# Patient Record
Sex: FEMALE | Race: WHITE | NOT HISPANIC OR LATINO | URBAN - METROPOLITAN AREA
[De-identification: names, ages, dates, MRNs, and addresses within clinical notes are randomized per-mention and may not be internally consistent; named-entity substitution may affect disease eponyms.]

---

## 2017-03-08 ENCOUNTER — IMPORTED ENCOUNTER (OUTPATIENT)
Dept: URBAN - METROPOLITAN AREA CLINIC 31 | Facility: CLINIC | Age: 72
End: 2017-03-08

## 2017-03-08 PROBLEM — H25.13: Noted: 2017-03-08

## 2017-03-08 PROBLEM — H40.1131: Noted: 2017-03-08

## 2017-03-08 PROCEDURE — 99214 OFFICE O/P EST MOD 30 MIN: CPT

## 2017-03-08 NOTE — PATIENT DISCUSSION
1.  Nuclear Sclerotic Cataract OU: Explained how cataracts can effect vision. Recommend clinical observation. The patient was advised to contact us if any change or worsening of vision. 2. Primary open angle glaucoma OU - Continue with current treatment plan. Discussed importance of compliance. Will continue to monitor. 3.  Return for an appointment in 6 months for comprehensive exam. VF 30-2. Fundus Photo Disc. with Dr. Lily Bravo.

## 2017-09-15 ENCOUNTER — IMPORTED ENCOUNTER (OUTPATIENT)
Dept: URBAN - METROPOLITAN AREA CLINIC 31 | Facility: CLINIC | Age: 72
End: 2017-09-15

## 2017-09-15 PROBLEM — H25.13: Noted: 2017-09-15

## 2017-09-15 PROBLEM — H40.1131: Noted: 2017-09-15

## 2017-09-15 PROCEDURE — 92083 EXTENDED VISUAL FIELD XM: CPT

## 2017-09-15 PROCEDURE — 92250 FUNDUS PHOTOGRAPHY W/I&R: CPT

## 2017-09-15 PROCEDURE — 92014 COMPRE OPH EXAM EST PT 1/>: CPT

## 2017-09-15 NOTE — PATIENT DISCUSSION
Primary open angle glaucoma OU - Continue with current treatment plan. Discussed importance of compliance. Will continue to monitor.

## 2018-03-15 ENCOUNTER — IMPORTED ENCOUNTER (OUTPATIENT)
Dept: URBAN - METROPOLITAN AREA CLINIC 31 | Facility: CLINIC | Age: 73
End: 2018-03-15

## 2018-03-15 PROBLEM — H25.13: Noted: 2018-03-15

## 2018-03-15 PROBLEM — H40.1130: Noted: 2018-03-15

## 2018-03-15 PROCEDURE — 92250 FUNDUS PHOTOGRAPHY W/I&R: CPT

## 2018-03-15 PROCEDURE — 99213 OFFICE O/P EST LOW 20 MIN: CPT

## 2018-03-15 NOTE — PATIENT DISCUSSION
1.  Nuclear Sclerotic Cataract OU: Explained how cataracts can effect vision. Recommend clinical observation. The patient was advised to contact us if any change or worsening of vision. 2. Primary open angle glaucoma OU:  Continue with current treatment plan. Discussed importance of compliance. Will continue to monitor.

## 2018-05-29 ENCOUNTER — APPOINTMENT (RX ONLY)
Dept: URBAN - METROPOLITAN AREA CLINIC 122 | Facility: CLINIC | Age: 73
Setting detail: DERMATOLOGY
End: 2018-05-29

## 2018-05-29 DIAGNOSIS — L85.3 XEROSIS CUTIS: ICD-10-CM

## 2018-05-29 DIAGNOSIS — L82.1 OTHER SEBORRHEIC KERATOSIS: ICD-10-CM

## 2018-05-29 DIAGNOSIS — L21.8 OTHER SEBORRHEIC DERMATITIS: ICD-10-CM

## 2018-05-29 DIAGNOSIS — D18.0 HEMANGIOMA: ICD-10-CM

## 2018-05-29 PROBLEM — L29.8 OTHER PRURITUS: Status: ACTIVE | Noted: 2018-05-29

## 2018-05-29 PROBLEM — D18.01 HEMANGIOMA OF SKIN AND SUBCUTANEOUS TISSUE: Status: ACTIVE | Noted: 2018-05-29

## 2018-05-29 PROCEDURE — 99203 OFFICE O/P NEW LOW 30 MIN: CPT

## 2018-05-29 PROCEDURE — ? COUNSELING

## 2018-05-29 PROCEDURE — ? TREATMENT REGIMEN

## 2018-05-29 PROCEDURE — ? PRESCRIPTION

## 2018-05-29 RX ORDER — CLOBETASOL PROPIONATE 0.46 MG/ML
SOLUTION TOPICAL
Qty: 1 | Refills: 1 | Status: ERX | COMMUNITY
Start: 2018-05-29

## 2018-05-29 RX ADMIN — CLOBETASOL PROPIONATE: 0.46 SOLUTION TOPICAL at 14:22

## 2018-05-29 ASSESSMENT — LOCATION ZONE DERM
LOCATION ZONE: FACE
LOCATION ZONE: TRUNK
LOCATION ZONE: LEG
LOCATION ZONE: ARM
LOCATION ZONE: SCALP

## 2018-05-29 ASSESSMENT — LOCATION SIMPLE DESCRIPTION DERM
LOCATION SIMPLE: LEFT CHEEK
LOCATION SIMPLE: CHEST
LOCATION SIMPLE: RIGHT FOREARM
LOCATION SIMPLE: SCALP
LOCATION SIMPLE: LEFT PRETIBIAL REGION

## 2018-05-29 ASSESSMENT — LOCATION DETAILED DESCRIPTION DERM
LOCATION DETAILED: LEFT MEDIAL SUPERIOR CHEST
LOCATION DETAILED: RIGHT SUPERIOR PARIETAL SCALP
LOCATION DETAILED: LEFT INFERIOR CENTRAL MALAR CHEEK
LOCATION DETAILED: RIGHT PROXIMAL DORSAL FOREARM
LOCATION DETAILED: LEFT DISTAL PRETIBIAL REGION

## 2018-05-29 NOTE — PROCEDURE: TREATMENT REGIMEN
Detail Level: Zone
Otc Regimen: Apply Mineral oil to the scalp at night and put a shower cap then wash off in the morning with T-Sal shampoo

## 2018-06-26 ENCOUNTER — APPOINTMENT (RX ONLY)
Dept: URBAN - METROPOLITAN AREA CLINIC 122 | Facility: CLINIC | Age: 73
Setting detail: DERMATOLOGY
End: 2018-06-26

## 2018-06-26 DIAGNOSIS — L21.8 OTHER SEBORRHEIC DERMATITIS: ICD-10-CM | Status: STABLE

## 2018-06-26 PROCEDURE — 99213 OFFICE O/P EST LOW 20 MIN: CPT

## 2018-06-26 PROCEDURE — ? TREATMENT REGIMEN

## 2018-06-26 PROCEDURE — ? COUNSELING

## 2018-06-26 ASSESSMENT — LOCATION DETAILED DESCRIPTION DERM: LOCATION DETAILED: RIGHT SUPERIOR PARIETAL SCALP

## 2018-06-26 ASSESSMENT — LOCATION ZONE DERM: LOCATION ZONE: SCALP

## 2018-06-26 ASSESSMENT — LOCATION SIMPLE DESCRIPTION DERM: LOCATION SIMPLE: SCALP

## 2018-06-26 NOTE — PROCEDURE: TREATMENT REGIMEN
Otc Regimen: Apply Mineral oil to the scalp at night and put a shower cap then wash off in the morning with T-Sal shampoo
Detail Level: Zone
Plan: Continue Clobetasol solution 0.05% BID x 2 weeks PRN flare ups

## 2018-09-24 ENCOUNTER — IMPORTED ENCOUNTER (OUTPATIENT)
Dept: URBAN - METROPOLITAN AREA CLINIC 31 | Facility: CLINIC | Age: 73
End: 2018-09-24

## 2018-09-24 PROBLEM — H25.813: Noted: 2018-09-24

## 2018-09-24 PROBLEM — H40.1130: Noted: 2018-09-24

## 2018-09-24 PROCEDURE — 92250 FUNDUS PHOTOGRAPHY W/I&R: CPT

## 2018-09-24 PROCEDURE — 92014 COMPRE OPH EXAM EST PT 1/>: CPT

## 2018-09-24 NOTE — PATIENT DISCUSSION
1.  Primary open angle glaucoma OU:  Continue with current treatment plan. Discussed importance of compliance. Will continue to monitor. 6 mos IOP. No VF this year. 2.  Combined Types of Cataract OU: Explained how cataracts can effect vision. Recommend clinical observation. The patient was advised to contact us if any change or worsening of vision.

## 2019-03-25 ENCOUNTER — IMPORTED ENCOUNTER (OUTPATIENT)
Dept: URBAN - METROPOLITAN AREA CLINIC 31 | Facility: CLINIC | Age: 74
End: 2019-03-25

## 2019-03-25 PROBLEM — H40.1131: Noted: 2019-03-25

## 2019-03-25 PROCEDURE — 92012 INTRM OPH EXAM EST PATIENT: CPT

## 2019-03-25 NOTE — PATIENT DISCUSSION
Primary open angle glaucoma OU mild - Continue with current treatment plan. Discussed importance of compliance. Will continue to monitor.    6 mos CE/VF

## 2019-06-19 ENCOUNTER — IMPORTED ENCOUNTER (OUTPATIENT)
Dept: URBAN - METROPOLITAN AREA CLINIC 31 | Facility: CLINIC | Age: 74
End: 2019-06-19

## 2019-06-19 PROBLEM — H40.1131: Noted: 2019-06-19

## 2019-06-19 PROBLEM — H35.3131: Noted: 2019-06-19

## 2019-06-19 PROCEDURE — 99213 OFFICE O/P EST LOW 20 MIN: CPT

## 2019-06-19 PROCEDURE — 92083 EXTENDED VISUAL FIELD XM: CPT

## 2019-06-19 PROCEDURE — 92250 FUNDUS PHOTOGRAPHY W/I&R: CPT

## 2019-06-19 NOTE — PATIENT DISCUSSION
1.  Primary open angle glaucoma OU mild - Continue with current treatment plan. Discussed importance of compliance. Will continue to monitor. 6 mos CE. 2. ARMD OU dry - Importance of smoking cessation blood pressure control and healthy diet were emphasized. In accordance with the AREDS study a good multivitamin containing EC and Zinc were recommened to be taken daily. Patient was instructed to self monitor their monocular vision (reading/[employer] Grid) at least weekly. Patient should immediately report any new onset of decreased vision or metamorphopsia.

## 2019-12-19 ENCOUNTER — IMPORTED ENCOUNTER (OUTPATIENT)
Dept: URBAN - METROPOLITAN AREA CLINIC 31 | Facility: CLINIC | Age: 74
End: 2019-12-19

## 2019-12-19 PROBLEM — H25.813: Noted: 2019-12-19

## 2019-12-19 PROBLEM — H40.1131: Noted: 2019-12-19

## 2019-12-19 PROBLEM — H35.3131: Noted: 2019-12-19

## 2019-12-19 PROCEDURE — 92250 FUNDUS PHOTOGRAPHY W/I&R: CPT

## 2019-12-19 PROCEDURE — 92014 COMPRE OPH EXAM EST PT 1/>: CPT

## 2020-06-22 ENCOUNTER — IMPORTED ENCOUNTER (OUTPATIENT)
Dept: URBAN - METROPOLITAN AREA CLINIC 31 | Facility: CLINIC | Age: 75
End: 2020-06-22

## 2020-06-22 PROBLEM — H40.1130: Noted: 2020-06-22

## 2020-06-22 PROBLEM — H25.813: Noted: 2020-06-22

## 2020-06-22 PROBLEM — H35.3131: Noted: 2020-06-22

## 2020-06-22 PROCEDURE — 92250 FUNDUS PHOTOGRAPHY W/I&R: CPT

## 2020-06-22 PROCEDURE — 92014 COMPRE OPH EXAM EST PT 1/>: CPT

## 2020-06-22 PROCEDURE — 92015 DETERMINE REFRACTIVE STATE: CPT

## 2020-06-22 NOTE — PATIENT DISCUSSION
Primary open angle glaucoma OU:  Continue with current treatment plan. Discussed importance of compliance. Will continue to monitor.

## 2021-06-03 ENCOUNTER — IMPORTED ENCOUNTER (OUTPATIENT)
Dept: URBAN - METROPOLITAN AREA CLINIC 31 | Facility: CLINIC | Age: 76
End: 2021-06-03

## 2021-06-03 PROBLEM — H40.1131: Noted: 2021-06-03

## 2021-06-03 PROBLEM — H25.813: Noted: 2021-06-03

## 2021-06-03 PROBLEM — H35.3131: Noted: 2021-06-03

## 2021-06-03 PROCEDURE — 92014 COMPRE OPH EXAM EST PT 1/>: CPT

## 2021-06-03 PROCEDURE — 92015 DETERMINE REFRACTIVE STATE: CPT

## 2021-06-03 PROCEDURE — 92250 FUNDUS PHOTOGRAPHY W/I&R: CPT

## 2022-04-02 ASSESSMENT — VISUAL ACUITY
OS_SC: 20/30
OD_SC: 20/30
OD_SC: 20/30-2
OD_SC: 20/40
OD_CC: J216''
OD_SC: 20/30+2
OS_SC: 20/40+2
OS_SC: 20/25-2
OS_CC: J216''
OD_CC: 20/60
OS_CC: 20/70-1
OD_PH: CC 20/30 -2
OS_SC: 20/30-2
OS_CC: J214''
OS_CC: 20/60-1
OD_CC: J114''
OS_PH: CC 20/30 +2
OS_PH: CC 20/25
OS_SC: 20/25-2
OD_CC: 20/80-2
OD_SC: 20/25-2
OD_SC: 20/30-3
OD_SC: 20/30+2
OS_SC: 20/40
OS_SC: 20/30
OS_PH: SC 20/30 -2
OS_SC: 20/25
OD_SC: 20/30+2
OD_PH: SC 20/40

## 2022-04-02 ASSESSMENT — TONOMETRY
OS_IOP_MMHG: 16
OD_IOP_MMHG: 17
OS_IOP_MMHG: 12
OD_IOP_MMHG: 18
OS_IOP_MMHG: 12
OS_IOP_MMHG: 12
OD_IOP_MMHG: 16
OD_IOP_MMHG: 17
OD_IOP_MMHG: 14
OD_IOP_MMHG: 17
OS_IOP_MMHG: 12
OD_IOP_MMHG: 14
OS_IOP_MMHG: 13
OD_IOP_MMHG: 17
OD_IOP_MMHG: 15
OS_IOP_MMHG: 14
OS_IOP_MMHG: 16
OS_IOP_MMHG: 15

## 2022-06-07 ENCOUNTER — ESTABLISHED PATIENT (OUTPATIENT)
Dept: URBAN - METROPOLITAN AREA CLINIC 31 | Facility: CLINIC | Age: 77
End: 2022-06-07

## 2022-06-07 DIAGNOSIS — H40.1131: ICD-10-CM

## 2022-06-07 DIAGNOSIS — H35.3131: ICD-10-CM

## 2022-06-07 DIAGNOSIS — H25.813: ICD-10-CM

## 2022-06-07 PROCEDURE — 92250 FUNDUS PHOTOGRAPHY W/I&R: CPT

## 2022-06-07 PROCEDURE — 92014 COMPRE OPH EXAM EST PT 1/>: CPT

## 2022-06-07 PROCEDURE — 92015 DETERMINE REFRACTIVE STATE: CPT

## 2022-06-07 ASSESSMENT — VISUAL ACUITY
OS_SC: 20/50-2
OD_PH: 20/25
OD_CC: 20/60-2
OD_SC: 20/80
OS_CC: 20/50+1
OS_PH: 20/25
OU_SC: 20/70-2

## 2022-06-07 ASSESSMENT — TONOMETRY
OS_IOP_MMHG: 14
OD_IOP_MMHG: 14

## 2022-07-06 ENCOUNTER — NEW PATIENT (OUTPATIENT)
Dept: URBAN - METROPOLITAN AREA CLINIC 26 | Facility: CLINIC | Age: 77
End: 2022-07-06

## 2022-07-06 VITALS
DIASTOLIC BLOOD PRESSURE: 92 MMHG | HEIGHT: 63 IN | WEIGHT: 179 LBS | BODY MASS INDEX: 31.71 KG/M2 | HEART RATE: 81 BPM | SYSTOLIC BLOOD PRESSURE: 131 MMHG

## 2022-07-06 DIAGNOSIS — H35.3132: ICD-10-CM

## 2022-07-06 DIAGNOSIS — H40.1131: ICD-10-CM

## 2022-07-06 DIAGNOSIS — H04.123: ICD-10-CM

## 2022-07-06 PROCEDURE — 92134 CPTRZ OPH DX IMG PST SGM RTA: CPT

## 2022-07-06 PROCEDURE — 92250 FUNDUS PHOTOGRAPHY W/I&R: CPT

## 2022-07-06 PROCEDURE — 92235 FLUORESCEIN ANGRPH MLTIFRAME: CPT

## 2022-07-06 PROCEDURE — 99204 OFFICE O/P NEW MOD 45 MIN: CPT

## 2022-07-06 ASSESSMENT — TONOMETRY
OD_IOP_MMHG: 14
OS_IOP_MMHG: 14

## 2022-07-06 ASSESSMENT — VISUAL ACUITY
OD_PH: 20/40+2
OS_CC: 20/40
OS_PH: 20/25-2
OD_CC: 20/40
OS_SC: 20/70+1
OD_SC: 20/80+1

## 2022-07-06 NOTE — PATIENT DISCUSSION
7/6/22: Cataract APPEARS VISUALLY SIGNIFICANT and patient advised to [address] [place] [address] for evaluation and treatment.

## 2022-07-21 ENCOUNTER — PREPPED CHART (OUTPATIENT)
Dept: URBAN - METROPOLITAN AREA CLINIC 34 | Facility: CLINIC | Age: 77
End: 2022-07-21

## 2022-07-27 ENCOUNTER — ESTABLISHED PATIENT (OUTPATIENT)
Dept: URBAN - METROPOLITAN AREA CLINIC 29 | Facility: CLINIC | Age: 77
End: 2022-07-27

## 2022-07-27 DIAGNOSIS — H04.123: ICD-10-CM

## 2022-07-27 DIAGNOSIS — H02.834: ICD-10-CM

## 2022-07-27 DIAGNOSIS — H40.1131: ICD-10-CM

## 2022-07-27 DIAGNOSIS — H35.3132: ICD-10-CM

## 2022-07-27 DIAGNOSIS — H02.831: ICD-10-CM

## 2022-07-27 DIAGNOSIS — H25.043: ICD-10-CM

## 2022-07-27 PROCEDURE — 92012 INTRM OPH EXAM EST PATIENT: CPT

## 2022-07-27 ASSESSMENT — VISUAL ACUITY
OS_CC: 20/60-2
OD_BAT: 20/50
OS_SC: 20/80
OS_BAT: 20/70
OD_SC: 20/200
OD_CC: 20/60

## 2022-07-27 ASSESSMENT — TONOMETRY
OS_IOP_MMHG: 12
OD_IOP_MMHG: 17

## 2022-07-27 NOTE — PATIENT DISCUSSION
The patient feels that the cataract is significantly impacting daily activities and has elected cataract surgery. The risks, benefits, and alternatives to surgery were discussed. The patient elects to proceed with surgery. Schedule KPE/IOL OD/OS distance standard.

## 2022-07-30 ENCOUNTER — TELEPHONE ENCOUNTER (OUTPATIENT)
Age: 77
End: 2022-07-30

## 2022-07-31 ENCOUNTER — TELEPHONE ENCOUNTER (OUTPATIENT)
Age: 77
End: 2022-07-31

## 2022-08-08 ENCOUNTER — PRE-OP/H&P (OUTPATIENT)
Dept: URBAN - METROPOLITAN AREA CLINIC 29 | Facility: CLINIC | Age: 77
End: 2022-08-08

## 2022-08-08 DIAGNOSIS — H25.043: ICD-10-CM

## 2022-08-08 PROCEDURE — 99499 UNLISTED E&M SERVICE: CPT

## 2022-08-08 PROCEDURE — 92025 CPTRIZED CORNEAL TOPOGRAPHY: CPT

## 2022-08-08 PROCEDURE — 92136 OPHTHALMIC BIOMETRY: CPT

## 2022-08-08 NOTE — PATIENT DISCUSSION
The patient feels that the cataract is significantly impacting daily activities and has elected cataract surgery. The risks, benefits, and alternatives to surgery were discussed. The patient elects to proceed with surgery. Schedule KPE/IOL OD/OS distance standard.  Pt defers toric will correct astigmatism in glasses.

## 2022-08-22 ENCOUNTER — SURGERY/PROCEDURE (OUTPATIENT)
Dept: URBAN - METROPOLITAN AREA SURGERY 17 | Facility: SURGERY | Age: 77
End: 2022-08-22

## 2022-08-22 DIAGNOSIS — H25.041: ICD-10-CM

## 2022-08-22 PROCEDURE — 66984 XCAPSL CTRC RMVL W/O ECP: CPT

## 2022-08-23 ENCOUNTER — POST-OP (OUTPATIENT)
Dept: URBAN - METROPOLITAN AREA CLINIC 31 | Facility: CLINIC | Age: 77
End: 2022-08-23

## 2022-08-23 DIAGNOSIS — Z96.1: ICD-10-CM

## 2022-08-23 PROCEDURE — 99024 POSTOP FOLLOW-UP VISIT: CPT

## 2022-08-23 ASSESSMENT — TONOMETRY: OD_IOP_MMHG: 29

## 2022-08-23 ASSESSMENT — VISUAL ACUITY: OD_SC: 20/30+2

## 2022-08-29 ENCOUNTER — SURGERY/PROCEDURE (OUTPATIENT)
Dept: URBAN - METROPOLITAN AREA SURGERY 17 | Facility: SURGERY | Age: 77
End: 2022-08-29

## 2022-08-29 DIAGNOSIS — H25.042: ICD-10-CM

## 2022-08-29 PROCEDURE — 66984 XCAPSL CTRC RMVL W/O ECP: CPT

## 2022-08-30 ENCOUNTER — POST-OP (OUTPATIENT)
Dept: URBAN - METROPOLITAN AREA CLINIC 31 | Facility: CLINIC | Age: 77
End: 2022-08-30

## 2022-08-30 DIAGNOSIS — Z96.1: ICD-10-CM

## 2022-08-30 PROCEDURE — 99024 POSTOP FOLLOW-UP VISIT: CPT

## 2022-08-30 ASSESSMENT — VISUAL ACUITY
OS_PH: 20/50
OD_SC: 20/30
OS_SC: 20/80

## 2022-08-30 ASSESSMENT — TONOMETRY
OD_IOP_MMHG: 22
OS_IOP_MMHG: 32

## 2022-09-06 ENCOUNTER — POST-OP (OUTPATIENT)
Dept: URBAN - METROPOLITAN AREA CLINIC 31 | Facility: CLINIC | Age: 77
End: 2022-09-06

## 2022-09-06 DIAGNOSIS — Z96.1: ICD-10-CM

## 2022-09-06 PROCEDURE — 99024 POSTOP FOLLOW-UP VISIT: CPT

## 2022-09-06 ASSESSMENT — TONOMETRY
OD_IOP_MMHG: 26
OS_IOP_MMHG: 32

## 2022-09-06 ASSESSMENT — VISUAL ACUITY
OD_PH: 20/25
OD_SC: 20/40
OS_PH: 20/30
OS_SC: 20/60

## 2022-09-06 NOTE — PATIENT DISCUSSION
Caps haze OD.  Increased IOP OU.  Residual astigmatism OU. Add Combigan BID to Latanoprost.  Taper off PF over 2-3 weeks.  IOP and re-refract OU in 3 weeks. Monitor caps OD.

## 2022-10-17 ENCOUNTER — POST-OP (OUTPATIENT)
Dept: URBAN - METROPOLITAN AREA CLINIC 31 | Facility: CLINIC | Age: 77
End: 2022-10-17

## 2022-10-17 DIAGNOSIS — Z98.42: ICD-10-CM

## 2022-10-17 DIAGNOSIS — Z98.41: ICD-10-CM

## 2022-10-17 PROCEDURE — 99024 POSTOP FOLLOW-UP VISIT: CPT

## 2022-10-17 ASSESSMENT — VISUAL ACUITY
OS_PH: 20/25-2
OS_CC: 20/40
OD_CC: 20/40
OD_PH: 20/20-3

## 2022-10-17 ASSESSMENT — TONOMETRY
OS_IOP_MMHG: 16
OD_IOP_MMHG: 16

## 2022-10-18 ENCOUNTER — COMPREHENSIVE EXAM (OUTPATIENT)
Dept: URBAN - METROPOLITAN AREA CLINIC 26 | Facility: CLINIC | Age: 77
End: 2022-10-18

## 2022-10-18 DIAGNOSIS — H04.123: ICD-10-CM

## 2022-10-18 DIAGNOSIS — H40.1131: ICD-10-CM

## 2022-10-18 DIAGNOSIS — H35.3132: ICD-10-CM

## 2022-10-18 PROCEDURE — 92134 CPTRZ OPH DX IMG PST SGM RTA: CPT

## 2022-10-18 PROCEDURE — 92012 INTRM OPH EXAM EST PATIENT: CPT

## 2022-10-18 ASSESSMENT — TONOMETRY
OS_IOP_MMHG: 13
OD_IOP_MMHG: 15

## 2022-10-18 ASSESSMENT — VISUAL ACUITY
OS_SC: 20/40+2
OD_PH: 20/30
OD_SC: 20/60+2

## 2022-10-18 NOTE — PATIENT DISCUSSION
10/18/22: The patient is at high risk for a choroidal neovascular membrane. NO CNV SEEN TODAY. Dry ARMD is responsible for some decrease in vision.

## 2022-10-18 NOTE — PATIENT DISCUSSION
7/6/22: Cataract APPEARS VISUALLY SIGNIFICANT and patient advised to [address] [place] [address] for evaluation and treatment.

## 2023-01-16 ENCOUNTER — CONSULTATION/EVALUATION (OUTPATIENT)
Dept: URBAN - METROPOLITAN AREA CLINIC 29 | Facility: CLINIC | Age: 78
End: 2023-01-16

## 2023-01-16 DIAGNOSIS — Z96.1: ICD-10-CM

## 2023-01-16 DIAGNOSIS — H43.813: ICD-10-CM

## 2023-01-16 DIAGNOSIS — H35.3132: ICD-10-CM

## 2023-01-16 DIAGNOSIS — H40.1132: ICD-10-CM

## 2023-01-16 DIAGNOSIS — H26.493: ICD-10-CM

## 2023-01-16 PROCEDURE — 99214 OFFICE O/P EST MOD 30 MIN: CPT

## 2023-01-16 ASSESSMENT — TONOMETRY
OD_IOP_MMHG: 14
OS_IOP_MMHG: 12

## 2023-01-16 ASSESSMENT — VISUAL ACUITY
OS_BAT: 20/70
OD_SC: 20/40
OS_PH: 20/25
OD_PH: 20/25
OD_BAT: 20/400
OS_SC: 20/40

## 2023-01-16 NOTE — PATIENT DISCUSSION
Discussed the risks/benefits of laser capsulotomy. Laser recommended. Patient elects to proceed. OD/OS.

## 2023-01-24 ENCOUNTER — SURGERY/PROCEDURE (OUTPATIENT)
Dept: URBAN - METROPOLITAN AREA SURGERY 17 | Facility: SURGERY | Age: 78
End: 2023-01-24

## 2023-01-24 DIAGNOSIS — H26.491: ICD-10-CM

## 2023-01-24 PROCEDURE — 66821 AFTER CATARACT LASER SURGERY: CPT

## 2023-01-31 ENCOUNTER — SURGERY/PROCEDURE (OUTPATIENT)
Dept: URBAN - METROPOLITAN AREA SURGERY 17 | Facility: SURGERY | Age: 78
End: 2023-01-31

## 2023-01-31 DIAGNOSIS — H26.492: ICD-10-CM

## 2023-01-31 PROCEDURE — 66821 AFTER CATARACT LASER SURGERY: CPT

## 2023-08-07 NOTE — PATIENT DISCUSSION
Patient understands vision will be limited by underlying ARMD OS/OD. warm and dry/color normal/normal/no rashes/no ulcers